# Patient Record
Sex: FEMALE | Race: BLACK OR AFRICAN AMERICAN | NOT HISPANIC OR LATINO | Employment: UNEMPLOYED | URBAN - METROPOLITAN AREA
[De-identification: names, ages, dates, MRNs, and addresses within clinical notes are randomized per-mention and may not be internally consistent; named-entity substitution may affect disease eponyms.]

---

## 2023-07-05 PROBLEM — S32.810A MULTIPLE CLOSED FRACTURES OF PELVIS WITH STABLE DISRUPTION OF PELVIC CIRCLE: Status: ACTIVE | Noted: 2023-07-05
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
## 2023-07-05 PROBLEM — T14.90XA TRAUMA: Status: ACTIVE | Noted: 2023-07-05

## 2023-07-05 PROBLEM — S72.21XA: Status: ACTIVE | Noted: 2023-07-05

## 2023-07-07 PROBLEM — A59.9 TRICHOMONAL INFECTION: Status: ACTIVE | Noted: 2023-07-07

## 2023-07-07 PROBLEM — S06.5XAA ACUTE SUBDURAL HEMATOMA: Status: ACTIVE | Noted: 2023-07-05

## 2023-07-07 PROBLEM — S32.009A LUMBAR TRANSVERSE PROCESS FRACTURE: Status: ACTIVE | Noted: 2023-07-07

## 2023-07-08 PROBLEM — E83.39 HYPOPHOSPHATEMIA: Status: ACTIVE | Noted: 2023-07-08

## 2023-07-08 PROBLEM — E87.6 HYPOKALEMIA: Status: ACTIVE | Noted: 2023-07-08

## 2023-07-10 PROBLEM — E87.6 HYPOKALEMIA: Status: RESOLVED | Noted: 2023-07-08 | Resolved: 2023-07-10

## 2023-07-11 PROBLEM — E83.39 HYPOPHOSPHATEMIA: Status: RESOLVED | Noted: 2023-07-08 | Resolved: 2023-07-11

## 2023-07-14 PROBLEM — R07.9 CHEST PAIN: Status: ACTIVE | Noted: 2023-07-14
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
## 2023-07-19 PROBLEM — S72.21XD: Status: ACTIVE | Noted: 2023-07-05

## 2023-07-19 PROBLEM — S32.10XA: Status: ACTIVE | Noted: 2023-07-19

## 2023-07-19 PROBLEM — S32.810D MULTIPLE CLOSED FRACTURES OF PELVIS WITH STABLE DISRUPTION OF PELVIC RING WITH ROUTINE HEALING: Status: ACTIVE | Noted: 2023-07-05

## 2023-08-19 PROBLEM — S30.0XXA TRAUMATIC HEMATOMA OF BUTTOCK: Status: ACTIVE | Noted: 2023-08-19

## 2023-08-21 PROBLEM — I96 NECROTIC ESCHAR: Status: ACTIVE | Noted: 2023-08-21

## 2023-08-23 PROBLEM — E87.6 HYPOKALEMIA: Status: RESOLVED | Noted: 2023-07-08 | Resolved: 2023-08-23

## 2023-08-26 ENCOUNTER — NURSE TRIAGE (OUTPATIENT)
Dept: ADMINISTRATIVE | Facility: CLINIC | Age: 38
End: 2023-08-26
Payer: MEDICAID
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
## 2023-08-29 ENCOUNTER — PATIENT OUTREACH (OUTPATIENT)
Dept: ADMINISTRATIVE | Facility: CLINIC | Age: 38
End: 2023-08-29
Payer: MEDICAID

## 2023-08-29 NOTE — PROGRESS NOTES
C3 nurse spoke with Kathi Dangelo for a TCC post hospital discharge follow up call. The patient reports does not have a scheduled HOSFU appointment. C3 nurse was unable to schedule [facility] appointment [facility]. Patient advised to contact their PCP to schedule a HOSPFU within 5-7 days. No PCP listed.